# Patient Record
Sex: MALE | Race: WHITE | NOT HISPANIC OR LATINO | Employment: FULL TIME | URBAN - METROPOLITAN AREA
[De-identification: names, ages, dates, MRNs, and addresses within clinical notes are randomized per-mention and may not be internally consistent; named-entity substitution may affect disease eponyms.]

---

## 2024-04-27 ENCOUNTER — APPOINTMENT (OUTPATIENT)
Dept: CT IMAGING | Facility: CLINIC | Age: 54
End: 2024-04-27
Attending: FAMILY MEDICINE
Payer: COMMERCIAL

## 2024-04-27 ENCOUNTER — HOSPITAL ENCOUNTER (EMERGENCY)
Facility: CLINIC | Age: 54
Discharge: HOME | End: 2024-04-27
Attending: FAMILY MEDICINE | Admitting: FAMILY MEDICINE
Payer: COMMERCIAL

## 2024-04-27 VITALS
RESPIRATION RATE: 26 BRPM | TEMPERATURE: 97 F | WEIGHT: 215 LBS | SYSTOLIC BLOOD PRESSURE: 155 MMHG | DIASTOLIC BLOOD PRESSURE: 78 MMHG | BODY MASS INDEX: 31.84 KG/M2 | HEIGHT: 69 IN | HEART RATE: 83 BPM | OXYGEN SATURATION: 97 %

## 2024-04-27 DIAGNOSIS — N20.1 URETEROLITHIASIS: ICD-10-CM

## 2024-04-27 LAB
ACANTHOCYTES BLD QL SMEAR: NORMAL
ALBUMIN SERPL BCG-MCNC: 4.7 G/DL (ref 3.5–5.2)
ALBUMIN UR-MCNC: 30 MG/DL
ALP SERPL-CCNC: 70 U/L (ref 40–150)
ALT SERPL W P-5'-P-CCNC: 36 U/L (ref 0–70)
ANION GAP SERPL CALCULATED.3IONS-SCNC: 17 MMOL/L (ref 7–15)
APPEARANCE UR: ABNORMAL
AST SERPL W P-5'-P-CCNC: 40 U/L (ref 0–45)
AUER BODIES BLD QL SMEAR: NORMAL
BASO STIPL BLD QL SMEAR: NORMAL
BASOPHILS # BLD AUTO: 0.1 10E3/UL (ref 0–0.2)
BASOPHILS NFR BLD AUTO: 1 %
BILIRUB DIRECT SERPL-MCNC: <0.2 MG/DL (ref 0–0.3)
BILIRUB SERPL-MCNC: 0.5 MG/DL
BILIRUB UR QL STRIP: NEGATIVE
BITE CELLS BLD QL SMEAR: NORMAL
BLISTER CELLS BLD QL SMEAR: NORMAL
BUN SERPL-MCNC: 16 MG/DL (ref 6–20)
BURR CELLS BLD QL SMEAR: NORMAL
CALCIUM SERPL-MCNC: 9.7 MG/DL (ref 8.6–10)
CHLORIDE SERPL-SCNC: 102 MMOL/L (ref 98–107)
COLOR UR AUTO: YELLOW
CREAT SERPL-MCNC: 1.16 MG/DL (ref 0.67–1.17)
DACRYOCYTES BLD QL SMEAR: NORMAL
DEPRECATED HCO3 PLAS-SCNC: 23 MMOL/L (ref 22–29)
EGFRCR SERPLBLD CKD-EPI 2021: 75 ML/MIN/1.73M2
ELLIPTOCYTES BLD QL SMEAR: NORMAL
EOSINOPHIL # BLD AUTO: 0 10E3/UL (ref 0–0.7)
EOSINOPHIL NFR BLD AUTO: 0 %
ERYTHROCYTE [DISTWIDTH] IN BLOOD BY AUTOMATED COUNT: 12.8 % (ref 10–15)
FRAGMENTS BLD QL SMEAR: NORMAL
GIANT PLATELETS BLD QL SMEAR: NORMAL
GLUCOSE SERPL-MCNC: 148 MG/DL (ref 70–99)
GLUCOSE UR STRIP-MCNC: NEGATIVE MG/DL
HCT VFR BLD AUTO: 51.4 % (ref 40–53)
HGB BLD-MCNC: 17.3 G/DL (ref 13.3–17.7)
HGB C CRYSTALS: NORMAL
HGB UR QL STRIP: ABNORMAL
HOWELL-JOLLY BOD BLD QL SMEAR: NORMAL
IMM GRANULOCYTES # BLD: 0 10E3/UL
IMM GRANULOCYTES NFR BLD: 0 %
KETONES UR STRIP-MCNC: 5 MG/DL
LEUKOCYTE ESTERASE UR QL STRIP: NEGATIVE
LIPASE SERPL-CCNC: 30 U/L (ref 13–60)
LYMPHOCYTES # BLD AUTO: 1.6 10E3/UL (ref 0.8–5.3)
LYMPHOCYTES NFR BLD AUTO: 12 %
MCH RBC QN AUTO: 30.6 PG (ref 26.5–33)
MCHC RBC AUTO-ENTMCNC: 33.7 G/DL (ref 31.5–36.5)
MCV RBC AUTO: 91 FL (ref 78–100)
MONOCYTES # BLD AUTO: 0.6 10E3/UL (ref 0–1.3)
MONOCYTES NFR BLD AUTO: 4 %
MUCOUS THREADS #/AREA URNS LPF: PRESENT /LPF
NEUTROPHILS # BLD AUTO: 10.8 10E3/UL (ref 1.6–8.3)
NEUTROPHILS NFR BLD AUTO: 82 %
NEUTS HYPERSEG BLD QL SMEAR: NORMAL
NITRATE UR QL: NEGATIVE
NRBC # BLD AUTO: 0 10E3/UL
NRBC BLD AUTO-RTO: 0 /100
PATH REV: NORMAL
PH UR STRIP: 6 [PH] (ref 5–7)
PLAT MORPH BLD: NORMAL
PLATELET # BLD AUTO: 283 10E3/UL (ref 150–450)
POLYCHROMASIA BLD QL SMEAR: NORMAL
POTASSIUM SERPL-SCNC: 4.3 MMOL/L (ref 3.4–5.3)
PROT SERPL-MCNC: 7.7 G/DL (ref 6.4–8.3)
RBC # BLD AUTO: 5.66 10E6/UL (ref 4.4–5.9)
RBC AGGLUT BLD QL: NORMAL
RBC MORPH BLD: NORMAL
RBC URINE: >182 /HPF
ROULEAUX BLD QL SMEAR: NORMAL
SICKLE CELLS BLD QL SMEAR: NORMAL
SMUDGE CELLS BLD QL SMEAR: NORMAL
SODIUM SERPL-SCNC: 142 MMOL/L (ref 135–145)
SP GR UR STRIP: 1.03 (ref 1–1.03)
SPHEROCYTES BLD QL SMEAR: NORMAL
STOMATOCYTES BLD QL SMEAR: NORMAL
TARGETS BLD QL SMEAR: NORMAL
TOXIC GRANULES BLD QL SMEAR: NORMAL
UROBILINOGEN UR STRIP-MCNC: 2 MG/DL
VARIANT LYMPHS BLD QL SMEAR: NORMAL
WBC # BLD AUTO: 13.1 10E3/UL (ref 4–11)
WBC URINE: 1 /HPF

## 2024-04-27 PROCEDURE — 36415 COLL VENOUS BLD VENIPUNCTURE: CPT | Performed by: FAMILY MEDICINE

## 2024-04-27 PROCEDURE — 258N000003 HC RX IP 258 OP 636: Performed by: FAMILY MEDICINE

## 2024-04-27 PROCEDURE — 99284 EMERGENCY DEPT VISIT MOD MDM: CPT | Performed by: FAMILY MEDICINE

## 2024-04-27 PROCEDURE — 96375 TX/PRO/DX INJ NEW DRUG ADDON: CPT | Performed by: FAMILY MEDICINE

## 2024-04-27 PROCEDURE — 99285 EMERGENCY DEPT VISIT HI MDM: CPT | Mod: 25 | Performed by: FAMILY MEDICINE

## 2024-04-27 PROCEDURE — 81001 URINALYSIS AUTO W/SCOPE: CPT | Performed by: FAMILY MEDICINE

## 2024-04-27 PROCEDURE — 83690 ASSAY OF LIPASE: CPT | Performed by: FAMILY MEDICINE

## 2024-04-27 PROCEDURE — 96374 THER/PROPH/DIAG INJ IV PUSH: CPT | Performed by: FAMILY MEDICINE

## 2024-04-27 PROCEDURE — 96361 HYDRATE IV INFUSION ADD-ON: CPT | Performed by: FAMILY MEDICINE

## 2024-04-27 PROCEDURE — 82248 BILIRUBIN DIRECT: CPT | Performed by: FAMILY MEDICINE

## 2024-04-27 PROCEDURE — 74176 CT ABD & PELVIS W/O CONTRAST: CPT

## 2024-04-27 PROCEDURE — 80048 BASIC METABOLIC PNL TOTAL CA: CPT | Performed by: FAMILY MEDICINE

## 2024-04-27 PROCEDURE — 85025 COMPLETE CBC W/AUTO DIFF WBC: CPT | Performed by: FAMILY MEDICINE

## 2024-04-27 PROCEDURE — 96376 TX/PRO/DX INJ SAME DRUG ADON: CPT | Performed by: FAMILY MEDICINE

## 2024-04-27 PROCEDURE — 250N000011 HC RX IP 250 OP 636: Performed by: FAMILY MEDICINE

## 2024-04-27 RX ORDER — ONDANSETRON 4 MG/1
4 TABLET, ORALLY DISINTEGRATING ORAL ONCE
Status: COMPLETED | OUTPATIENT
Start: 2024-04-27 | End: 2024-04-27

## 2024-04-27 RX ORDER — OXYCODONE HYDROCHLORIDE 5 MG/1
5 TABLET ORAL EVERY 6 HOURS PRN
Qty: 12 TABLET | Refills: 0 | Status: SHIPPED | OUTPATIENT
Start: 2024-04-27 | End: 2024-04-30

## 2024-04-27 RX ORDER — HYDROMORPHONE HYDROCHLORIDE 1 MG/ML
0.5 INJECTION, SOLUTION INTRAMUSCULAR; INTRAVENOUS; SUBCUTANEOUS
Status: DISCONTINUED | OUTPATIENT
Start: 2024-04-27 | End: 2024-04-27 | Stop reason: HOSPADM

## 2024-04-27 RX ORDER — TAMSULOSIN HYDROCHLORIDE 0.4 MG/1
0.4 CAPSULE ORAL DAILY
Qty: 10 CAPSULE | Refills: 0 | Status: SHIPPED | OUTPATIENT
Start: 2024-04-27 | End: 2024-05-07

## 2024-04-27 RX ORDER — ONDANSETRON 4 MG/1
4 TABLET, ORALLY DISINTEGRATING ORAL EVERY 8 HOURS PRN
Qty: 10 TABLET | Refills: 0 | Status: SHIPPED | OUTPATIENT
Start: 2024-04-27 | End: 2024-04-30

## 2024-04-27 RX ORDER — KETOROLAC TROMETHAMINE 15 MG/ML
15 INJECTION, SOLUTION INTRAMUSCULAR; INTRAVENOUS ONCE
Status: COMPLETED | OUTPATIENT
Start: 2024-04-27 | End: 2024-04-27

## 2024-04-27 RX ORDER — ONDANSETRON 2 MG/ML
4 INJECTION INTRAMUSCULAR; INTRAVENOUS ONCE
Status: COMPLETED | OUTPATIENT
Start: 2024-04-27 | End: 2024-04-27

## 2024-04-27 RX ADMIN — ONDANSETRON 4 MG: 4 TABLET, ORALLY DISINTEGRATING ORAL at 10:52

## 2024-04-27 RX ADMIN — ONDANSETRON 4 MG: 2 INJECTION INTRAMUSCULAR; INTRAVENOUS at 11:34

## 2024-04-27 RX ADMIN — SODIUM CHLORIDE 1000 ML: 9 INJECTION, SOLUTION INTRAVENOUS at 11:28

## 2024-04-27 RX ADMIN — HYDROMORPHONE HYDROCHLORIDE 0.5 MG: 1 INJECTION, SOLUTION INTRAMUSCULAR; INTRAVENOUS; SUBCUTANEOUS at 13:57

## 2024-04-27 RX ADMIN — KETOROLAC TROMETHAMINE 15 MG: 15 INJECTION, SOLUTION INTRAMUSCULAR; INTRAVENOUS at 11:29

## 2024-04-27 RX ADMIN — HYDROMORPHONE HYDROCHLORIDE 0.5 MG: 1 INJECTION, SOLUTION INTRAMUSCULAR; INTRAVENOUS; SUBCUTANEOUS at 11:48

## 2024-04-27 ASSESSMENT — ACTIVITIES OF DAILY LIVING (ADL)
ADLS_ACUITY_SCORE: 35

## 2024-04-27 ASSESSMENT — COLUMBIA-SUICIDE SEVERITY RATING SCALE - C-SSRS
2. HAVE YOU ACTUALLY HAD ANY THOUGHTS OF KILLING YOURSELF IN THE PAST MONTH?: NO
6. HAVE YOU EVER DONE ANYTHING, STARTED TO DO ANYTHING, OR PREPARED TO DO ANYTHING TO END YOUR LIFE?: NO
1. IN THE PAST MONTH, HAVE YOU WISHED YOU WERE DEAD OR WISHED YOU COULD GO TO SLEEP AND NOT WAKE UP?: NO

## 2024-04-27 NOTE — ED PROVIDER NOTES
"  History     Chief Complaint   Patient presents with    Flank Pain     HPI  Iain Claros is a 53 year old male, unknown past medical history, presents to the emergency department with concerns of severe left-sided flank pain which began intermittently on Thursday and since about 4:00 this morning became much more severe.  Dark urine and some dysuria.  History is obtained from the patient who presents with his wife.  He states he came in today because he was awoken from sleep at around 4:00 this morning with severe left-sided flank pain with nausea and multiple episodes of vomiting.  Tried to take some fluids and some OTC pain medication but was completely unable to keep anything down.  No fever chills or sweats recently.  No trauma or injury of any kind.  Patient has not had symptoms like this before although does note that over the past few days he did have some twinges of pain in the left flank area in the absence of any kind of trauma lifting straining bending etc.  He thought maybe this was because of uncomfortable seating and positioning on an aircraft as he was on vacation recently when the symptoms began earlier in the week.      Allergies:  Allergies   Allergen Reactions    Penicillins Rash       Problem List:    There are no problems to display for this patient.       Past Medical History:    No past medical history on file.    Past Surgical History:    No past surgical history on file.    Family History:    No family history on file.    Social History:  Marital Status:  Single [1]        Medications:    ondansetron (ZOFRAN ODT) 4 MG ODT tab  oxyCODONE (ROXICODONE) 5 MG tablet  tamsulosin (FLOMAX) 0.4 MG capsule          Review of Systems   All other systems reviewed and are negative.      Physical Exam   BP: (!) 163/100  Pulse: 81  Temp: 97  F (36.1  C)  Resp: 26  Height: 175.3 cm (5' 9\")  Weight: 97.5 kg (215 lb)  SpO2: 99 %      Physical Exam  Vitals and nursing note reviewed.   Constitutional:  "      General: He is in acute distress.      Appearance: Normal appearance. He is normal weight. He is ill-appearing.   HENT:      Head: Normocephalic and atraumatic.      Right Ear: Tympanic membrane, ear canal and external ear normal.      Left Ear: Tympanic membrane, ear canal and external ear normal.      Nose: Nose normal.      Mouth/Throat:      Mouth: Mucous membranes are dry.      Pharynx: Oropharynx is clear.   Eyes:      Extraocular Movements: Extraocular movements intact.      Conjunctiva/sclera: Conjunctivae normal.      Pupils: Pupils are equal, round, and reactive to light.   Cardiovascular:      Rate and Rhythm: Normal rate and regular rhythm.      Pulses: Normal pulses.      Heart sounds: Normal heart sounds.   Pulmonary:      Effort: Pulmonary effort is normal.      Breath sounds: Normal breath sounds.   Abdominal:      General: Bowel sounds are normal.      Palpations: Abdomen is soft.   Musculoskeletal:         General: Normal range of motion.      Cervical back: Normal range of motion and neck supple.   Skin:     General: Skin is warm and dry.      Capillary Refill: Capillary refill takes less than 2 seconds.   Neurological:      General: No focal deficit present.      Mental Status: He is alert and oriented to person, place, and time.   Psychiatric:         Mood and Affect: Mood normal.         Behavior: Behavior normal.         ED Course        Procedures         Results for orders placed or performed during the hospital encounter of 04/27/24 (from the past 24 hour(s))   UA with Microscopic reflex to Culture    Specimen: Urine, Midstream   Result Value Ref Range    Color Urine Yellow Colorless, Straw, Light Yellow, Yellow    Appearance Urine Slightly Cloudy (A) Clear    Glucose Urine Negative Negative mg/dL    Bilirubin Urine Negative Negative    Ketones Urine 5 (A) Negative mg/dL    Specific Gravity Urine 1.029 1.003 - 1.035    Blood Urine Moderate (A) Negative    pH Urine 6.0 5.0 - 7.0     Protein Albumin Urine 30 (A) Negative mg/dL    Urobilinogen Urine 2.0 Normal, 2.0 mg/dL    Nitrite Urine Negative Negative    Leukocyte Esterase Urine Negative Negative    Mucus Urine Present (A) None Seen /LPF    RBC Urine >182 (H) <=2 /HPF    WBC Urine 1 <=5 /HPF    Narrative    Urine Culture not indicated   CBC with Platelets & Differential    Narrative    The following orders were created for panel order CBC with Platelets & Differential.  Procedure                               Abnormality         Status                     ---------                               -----------         ------                     CBC with platelets and d...[[id number]]  Abnormal            Final result               RBC and Platelet Morphology[[id number]]                      Final result                 Please view results for these tests on the individual orders.   Basic metabolic panel   Result Value Ref Range    Sodium 142 135 - 145 mmol/L    Potassium 4.3 3.4 - 5.3 mmol/L    Chloride 102 98 - 107 mmol/L    Carbon Dioxide (CO2) 23 22 - 29 mmol/L    Anion Gap 17 (H) 7 - 15 mmol/L    Urea Nitrogen 16.0 6.0 - 20.0 mg/dL    Creatinine 1.16 0.67 - 1.17 mg/dL    GFR Estimate 75 >60 mL/min/1.73m2    Calcium 9.7 8.6 - 10.0 mg/dL    Glucose 148 (H) 70 - 99 mg/dL   CBC with platelets and differential   Result Value Ref Range    WBC Count 13.1 (H) 4.0 - 11.0 10e3/uL    RBC Count 5.66 4.40 - 5.90 10e6/uL    Hemoglobin 17.3 13.3 - 17.7 g/dL    Hematocrit 51.4 40.0 - 53.0 %    MCV 91 78 - 100 fL    MCH 30.6 26.5 - 33.0 pg    MCHC 33.7 31.5 - 36.5 g/dL    RDW 12.8 10.0 - 15.0 %    Platelet Count 283 150 - 450 10e3/uL    % Neutrophils 82 %    % Lymphocytes 12 %    % Monocytes 4 %    % Eosinophils 0 %    % Basophils 1 %    % Immature Granulocytes 0 %    NRBCs per 100 WBC 0 <1 /100    Absolute Neutrophils 10.8 (H) 1.6 - 8.3 10e3/uL    Absolute Lymphocytes 1.6 0.8 - 5.3 10e3/uL    Absolute Monocytes 0.6 0.0 - 1.3 10e3/uL    Absolute Eosinophils 0.0  0.0 - 0.7 10e3/uL    Absolute Basophils 0.1 0.0 - 0.2 10e3/uL    Absolute Immature Granulocytes 0.0 <=0.4 10e3/uL    Absolute NRBCs 0.0 10e3/uL   Hepatic panel   Result Value Ref Range    Protein Total 7.7 6.4 - 8.3 g/dL    Albumin 4.7 3.5 - 5.2 g/dL    Bilirubin Total 0.5 <=1.2 mg/dL    Alkaline Phosphatase 70 40 - 150 U/L    AST 40 0 - 45 U/L    ALT 36 0 - 70 U/L    Bilirubin Direct <0.20 0.00 - 0.30 mg/dL   Lipase   Result Value Ref Range    Lipase 30 13 - 60 U/L   RBC and Platelet Morphology   Result Value Ref Range    RBC Morphology Confirmed RBC Indices     Platelet Assessment  Automated Count Confirmed. Platelet morphology is normal.     Automated Count Confirmed. Platelet morphology is normal.    Giant Platelets      Acanthocytes      Estela Rods      Basophilic Stippling      Bite Cells      Blister Cells      Radha Cells      Elliptocytes      Hgb C Crystals      Galindo-Jolly Bodies      Hypersegmented Neutrophils      Polychromasia      RBC agglutination      RBC Fragments      Reactive Lymphocytes      Rouleaux      Sickle Cells      Smudge Cells      Spherocytes      Stomatocytes      Target Cells      Teardrop Cells      Toxic Neutrophils      Pathologist Review Comments (Blood)     Abd/pelvis CT - no contrast - Stone Protocol    Narrative    EXAM: CT ABDOMEN PELVIS W/O CONTRAST  LOCATION: [facility]  DATE: 4/27/2024    INDICATION: Left flank pain, suspect ureterolithiasis  COMPARISON: None.  TECHNIQUE: CT scan of the abdomen and pelvis was performed without IV contrast. Multiplanar reformats were obtained. Dose reduction techniques were used.  CONTRAST: None.    FINDINGS:   LOWER CHEST: Calcified granuloma in the left lower lobe. Calcified left hilar lymph nodes. Small to moderate-sized hiatal hernia.    HEPATOBILIARY: Hepatic steatosis. No calcified gallstones or biliary ductal dilatation.    PANCREAS: Normal.    SPLEEN: Punctate calcified splenic granuloma.    ADRENAL  GLANDS: Normal.    KIDNEYS/BLADDER: A 3 x 4 mm obstructing calculus in the mid left ureter with moderate left hydronephrosis and perinephric stranding. No right-sided calculi or hydronephrosis. No urinary bladder calculi.    BOWEL: No small bowel or colonic obstruction or inflammatory changes. Normal appendix.    LYMPH NODES: No lymphadenopathy.    VASCULATURE: No abdominal aortic aneurysm.    PELVIC ORGANS: No pelvic masses. No free fluid or fluid collections.    MUSCULOSKELETAL: Small fat-containing umbilical hernia measuring 2.5 x 2.1 cm. Moderate-sized fat-containing right inguinal hernia. No suspicious lesions in the bones.      Impression    IMPRESSION:   1.  A 4 mm obstructing calculus in the left mid ureter with moderate left hydronephrosis and perinephric stranding.  2.  Mild hepatic steatosis.  3.  Moderate-sized fat-containing left inguinal hernia. Small fat-containing umbilical hernia.     2:58 PM  I reviewed all lab diagnostics and independently interpreted a CT scan prior to the radiologist read which was unfortunately delayed for unclear reasons.  I agree with the radiologist interpretation.  Discussed expectant stone management and criteria for return to the emergency department with the patient.  He is comfortable currently and would very much like to try expectant stone management rather than intervention by urology at this point.  He is given contact information for urology disposition is to home on oral NSAIDs ibuprofen 4 mg p.o. 3 times daily with food, Flomax to hopefully facilitate passage of this more proximal stone, oxycodone for breakthrough pain, Zofran for nausea/vomiting.  Vigorous hydration.      Medications   HYDROmorphone (PF) (DILAUDID) injection 0.5 mg (0.5 mg Intravenous $Given 4/27/24 1357)   ondansetron (ZOFRAN ODT) ODT tab 4 mg (4 mg Oral $Given 4/27/24 1052)   sodium chloride 0.9% BOLUS 1,000 mL (0 mLs Intravenous Stopped 4/27/24 1427)   ketorolac (TORADOL) injection 15 mg (15  mg Intravenous $Given 4/27/24 1129)   ondansetron (ZOFRAN) injection 4 mg (4 mg Intravenous $Given 4/27/24 1134)       Assessments & Plan (with Medical Decision Making)   Assessment and plan with medical decision making at the time stamp above.      Disclaimer: This note consists of symbols derived from keyboarding, dictation and/or voice recognition software. As a result, there may be errors in the script that have gone undetected. Please consider this when interpreting information found in this chart.    I have reviewed the nursing notes.    I have reviewed the findings, diagnosis, plan and need for follow up with the patient.          New Prescriptions    ONDANSETRON (ZOFRAN ODT) 4 MG ODT TAB    Take 1 tablet (4 mg) by mouth every 8 hours as needed for nausea    OXYCODONE (ROXICODONE) 5 MG TABLET    Take 1 tablet (5 mg) by mouth every 6 hours as needed for pain    TAMSULOSIN (FLOMAX) 0.4 MG CAPSULE    Take 1 capsule (0.4 mg) by mouth daily for 10 days       Final diagnoses:   Ureterolithiasis - 4 mm left ureteral stone       4/27/2024   [facility] EMERGENCY DEPT       Kolby Pearl MD  04/27/24 3227

## 2024-04-27 NOTE — ED TRIAGE NOTES
Pt presents in severe distress with left sided flank pain. Mild symptoms began intermittently on Thursday. Today at 0400 severe pain began. Dark urine and some pain with urination. Pt also presents with nausea and vomiting. Zofran given in triage.      Triage Assessment (Adult)       Row Name 04/27/24 1048          Triage Assessment    Airway WDL WDL        Respiratory WDL    Respiratory WDL X;rhythm/pattern     Rhythm/Pattern, Respiratory tachypneic        Skin Circulation/Temperature WDL    Skin Circulation/Temperature WDL WDL        Cardiac WDL    Cardiac WDL WDL        Peripheral/Neurovascular WDL    Peripheral Neurovascular WDL WDL        Cognitive/Neuro/Behavioral WDL    Cognitive/Neuro/Behavioral WDL X;mood/behavior     Mood/Behavior restless

## 2024-04-27 NOTE — DISCHARGE INSTRUCTIONS
Push fluids.  Ibuprofen 400 mg p.o. 3 times daily with food.  Oxycodone for breakthrough pain.  Zofran as needed for nausea/vomiting.  Flomax once daily as we discussed to hopefully expedite stone passage.  As long as you are able to tolerate outpatient management you may wait up to a couple of weeks to pass the stone.  If you are not able to tolerate outpatient management, expectant, for the stone you are welcome to return to the emergency department at any time or contact urology at the number given.

## 2024-04-27 NOTE — ED NOTES
Left flank pain, nausea, vomiting that started at 0400 this morning.  History of kidney stones.  Patient stated that his urine is concentrated.  Denies constipation, diarrhea, pain with urination.

## 2024-04-28 ENCOUNTER — HOSPITAL ENCOUNTER (EMERGENCY)
Facility: CLINIC | Age: 54
Discharge: HOME | End: 2024-04-29
Attending: FAMILY MEDICINE | Admitting: FAMILY MEDICINE
Payer: COMMERCIAL

## 2024-04-28 DIAGNOSIS — N20.1 URETERAL STONE: ICD-10-CM

## 2024-04-28 PROCEDURE — 96375 TX/PRO/DX INJ NEW DRUG ADDON: CPT

## 2024-04-28 PROCEDURE — 96361 HYDRATE IV INFUSION ADD-ON: CPT

## 2024-04-28 PROCEDURE — 96376 TX/PRO/DX INJ SAME DRUG ADON: CPT

## 2024-04-28 PROCEDURE — 250N000011 HC RX IP 250 OP 636: Performed by: FAMILY MEDICINE

## 2024-04-28 PROCEDURE — 250N000013 HC RX MED GY IP 250 OP 250 PS 637: Performed by: FAMILY MEDICINE

## 2024-04-28 PROCEDURE — 99284 EMERGENCY DEPT VISIT MOD MDM: CPT | Mod: 25

## 2024-04-28 PROCEDURE — 99285 EMERGENCY DEPT VISIT HI MDM: CPT | Performed by: FAMILY MEDICINE

## 2024-04-28 PROCEDURE — 258N000003 HC RX IP 258 OP 636: Performed by: FAMILY MEDICINE

## 2024-04-28 PROCEDURE — 96374 THER/PROPH/DIAG INJ IV PUSH: CPT

## 2024-04-28 RX ORDER — DOCUSATE SODIUM 100 MG/1
100 CAPSULE, LIQUID FILLED ORAL 2 TIMES DAILY
Status: DISCONTINUED | OUTPATIENT
Start: 2024-04-28 | End: 2024-04-29 | Stop reason: HOSPADM

## 2024-04-28 RX ORDER — HYDROMORPHONE HYDROCHLORIDE 1 MG/ML
0.5 INJECTION, SOLUTION INTRAMUSCULAR; INTRAVENOUS; SUBCUTANEOUS
Status: DISCONTINUED | OUTPATIENT
Start: 2024-04-28 | End: 2024-04-29 | Stop reason: HOSPADM

## 2024-04-28 RX ORDER — OXYCODONE HYDROCHLORIDE 5 MG/1
5-10 TABLET ORAL EVERY 6 HOURS PRN
Qty: 12 TABLET | Refills: 0 | Status: SHIPPED | OUTPATIENT
Start: 2024-04-28 | End: 2024-05-01

## 2024-04-28 RX ORDER — KETOROLAC TROMETHAMINE 15 MG/ML
10 INJECTION, SOLUTION INTRAMUSCULAR; INTRAVENOUS ONCE
Status: COMPLETED | OUTPATIENT
Start: 2024-04-28 | End: 2024-04-28

## 2024-04-28 RX ORDER — ONDANSETRON 2 MG/ML
4 INJECTION INTRAMUSCULAR; INTRAVENOUS
Status: DISCONTINUED | OUTPATIENT
Start: 2024-04-28 | End: 2024-04-29 | Stop reason: HOSPADM

## 2024-04-28 RX ADMIN — DOCUSATE SODIUM 100 MG: 100 CAPSULE, LIQUID FILLED ORAL at 22:41

## 2024-04-28 RX ADMIN — ONDANSETRON 4 MG: 2 INJECTION INTRAMUSCULAR; INTRAVENOUS at 21:15

## 2024-04-28 RX ADMIN — HYDROMORPHONE HYDROCHLORIDE 0.5 MG: 1 INJECTION, SOLUTION INTRAMUSCULAR; INTRAVENOUS; SUBCUTANEOUS at 21:14

## 2024-04-28 RX ADMIN — KETOROLAC TROMETHAMINE 10 MG: 15 INJECTION, SOLUTION INTRAMUSCULAR; INTRAVENOUS at 21:15

## 2024-04-28 RX ADMIN — SODIUM CHLORIDE 1000 ML: 9 INJECTION, SOLUTION INTRAVENOUS at 21:16

## 2024-04-28 RX ADMIN — HYDROMORPHONE HYDROCHLORIDE 0.5 MG: 1 INJECTION, SOLUTION INTRAMUSCULAR; INTRAVENOUS; SUBCUTANEOUS at 22:30

## 2024-04-28 ASSESSMENT — ACTIVITIES OF DAILY LIVING (ADL)
ADLS_ACUITY_SCORE: 35

## 2024-04-28 ASSESSMENT — COLUMBIA-SUICIDE SEVERITY RATING SCALE - C-SSRS
2. HAVE YOU ACTUALLY HAD ANY THOUGHTS OF KILLING YOURSELF IN THE PAST MONTH?: NO
1. IN THE PAST MONTH, HAVE YOU WISHED YOU WERE DEAD OR WISHED YOU COULD GO TO SLEEP AND NOT WAKE UP?: NO
6. HAVE YOU EVER DONE ANYTHING, STARTED TO DO ANYTHING, OR PREPARED TO DO ANYTHING TO END YOUR LIFE?: NO

## 2024-04-29 VITALS
HEART RATE: 86 BPM | OXYGEN SATURATION: 95 % | RESPIRATION RATE: 18 BRPM | BODY MASS INDEX: 31.84 KG/M2 | TEMPERATURE: 98.1 F | WEIGHT: 215 LBS | SYSTOLIC BLOOD PRESSURE: 137 MMHG | DIASTOLIC BLOOD PRESSURE: 93 MMHG | HEIGHT: 69 IN

## 2024-04-29 NOTE — DISCHARGE INSTRUCTIONS
RETURN TO THE EMERGENCY ROOM FOR THE FOLLOWING:    Severely worsened pain, fever greater than 101, vomiting and dehydration, or at anytime for any concern.    FOLLOW UP:    Urology referral order placed at the time of discharge, expect a phone call within the next few days to help with scheduling.    TREATMENT RECOMMENDATIONS:    Ibuprofen 600 mg every six hours for pain (7 days duration).  Tylenol 1000 mg every six hours for pain (7 days duration).  Therefore, you can alternate these every three hours and do it safely.  ONLY take these medications if it is safe to do so given your medical history.    Oxycodone 1-2 tabs every six hours as needed for pain control that is not relieved by the above.    Consider MiraLAX to help promote a regular stool pattern.  The goal is to have a soft, easy stool either every day or every other day.  You can titrate MiraLAX to achieve this goal.  MiraLAX is a nonsoluble fiber and is not addictive.  It will not cause bowel dysfunction or pathology.      NURSE ADVICE LINE:  [phone] or [phone]

## 2024-04-29 NOTE — ED TRIAGE NOTES
Pt arrives with c/o sever L flank/groin pain. Pt was dx with a kidney stone yesterday and sent home with oxycodone. Pt reports taking approx. 6 oxycodone since yesterday. Pt reports no BM today, he usually goes daily.

## 2024-04-29 NOTE — ED TRIAGE NOTES
Pt presents with increased pain related to a very recently diagnosed kidney stone. Was prescribed oxycodone for pain management at home but has not had adequate relief. Endorses nausea with emesis and constipation. Wife reports pt did an enema earlier today.     Triage Assessment (Adult)       Row Name 04/28/24 7415          Triage Assessment    Airway WDL WDL        Respiratory WDL    Respiratory WDL WDL        Skin Circulation/Temperature WDL    Skin Circulation/Temperature WDL WDL        Cardiac WDL    Cardiac WDL WDL        Peripheral/Neurovascular WDL    Peripheral Neurovascular WDL WDL        Cognitive/Neuro/Behavioral WDL    Cognitive/Neuro/Behavioral WDL WDL        [place] Coma Scale    Best Eye Response 4-->(E4) spontaneous     Best Motor Response 6-->(M6) obeys commands     Best Verbal Response 5-->(V5) oriented     [place] Coma Scale Score 15

## 2024-04-29 NOTE — ED PROVIDER NOTES
"  HPI   Patient is a 53-year-old male presenting with his female partner by private car for recurring left flank pain.  Per my chart review, the patient was seen in the ED yesterday and diagnosed with a 4 mm mid ureteral stone.  He had not had a ureteral stone previously.  No prior abdominal surgery.  He was given a prescription of Zofran, oxycodone, and tamsulosin.  He has been using the Flomax.  He has been taking the oxycodone 1 tablet every 4 hours.  He has not been using ibuprofen or Tylenol.  He started to use the Zofran yesterday but it seemed to elicit more nausea and so he stopped.    The patient has been nauseous with vomiting and worsened pain throughout the overnight.  This morning at about 4:00 AM he was retching and he says, \"I think that move the stone because it lessen the pain afterward.\"  Then, this evening his pain severely worsened again.  He is nauseous.  No fever.  No other new symptoms described.      Allergies:  Allergies   Allergen Reactions    Penicillins Rash     Problem List:    There are no problems to display for this patient.     Past Medical History:    No past medical history on file.  Past Surgical History:    No past surgical history on file.  Family History:    No family history on file.  Social History:  Marital Status:  Single [1]      Medications:    oxyCODONE (ROXICODONE) 5 MG tablet  ondansetron (ZOFRAN ODT) 4 MG ODT tab  oxyCODONE (ROXICODONE) 5 MG tablet  tamsulosin (FLOMAX) 0.4 MG capsule      Review of Systems   All other systems reviewed and are negative.      PE   BP: (!) 176/101  Pulse: 86  Temp: 98.1  F (36.7  C)  Resp: 18  Height: 175.3 cm (5' 9\")  Weight: 97.5 kg (215 lb)  SpO2: 95 %  Physical Exam  Vitals and nursing note reviewed.   Constitutional:       General: He is in acute distress.   HENT:      Head: Atraumatic.      Right Ear: External ear normal.      Left Ear: External ear normal.      Nose: Nose normal.      Mouth/Throat:      Mouth: Mucous membranes are " moist.      Pharynx: Oropharynx is clear.   Eyes:      General: No scleral icterus.     Extraocular Movements: Extraocular movements intact.      Conjunctiva/sclera: Conjunctivae normal.      Pupils: Pupils are equal, round, and reactive to light.   Cardiovascular:      Rate and Rhythm: Normal rate.   Pulmonary:      Effort: Pulmonary effort is normal. No respiratory distress.   Musculoskeletal:         General: Normal range of motion.      Cervical back: Normal range of motion.   Skin:     General: Skin is warm and dry.   Neurological:      Mental Status: He is alert and oriented to person, place, and time.   Psychiatric:         Behavior: Behavior normal.         ED COURSE and MDM   2117.  The patient has obvious discomfort.  Dilaudid, Toradol, fluid bolus, Zofran ordered.    2356.  Patient with improvement after medications given.  Oxycodone provided, 12 tablets.  I changed the dose to 1 to 2 tablets every 6 hours instead of 1 tablet every 4 hours.  Schedule ibuprofen and Tylenol as directed.  MiraLAX as directed.  Urology referral order placed.  Patient agrees with plan.    Electronic medical chart reviewed, including medical problems, medications, medical allergies, social history.  Recent hospitalizations and surgical procedures reviewed.  Recent clinic visits and consultations reviewed.  Recent labs and test results reviewed.  Nursing notes reviewed.    The patient, their parent if applicable, and/or their medical decision maker(s) and I have reviewed all of the available historical information, applicable PMH, physical exam findings, and objective diagnostic data gathered during this ED visit.  We then discussed all work-up options and then together agreed upon the course taken during this visit.  The ultimate disposition and plan was a cooperative decision made between myself and the patient, their parent if applicable, and/or their legal decision maker(s).  The risks and benefits of all decisions made  during this visit were discussed to the best of my abilities given the circumstances, and all parties are understanding of the pertinent ramifications of these decisions.      LABS  Labs Ordered and Resulted from Time of ED Arrival to Time of ED Departure - No data to display    IMAGING  Images reviewed by me.  Radiology report also reviewed.  No orders to display       Procedures    Medications   HYDROmorphone (PF) (DILAUDID) injection 0.5 mg (0.5 mg Intravenous $Given 4/28/24 2230)   ondansetron (ZOFRAN) injection 4 mg (4 mg Intravenous $Given 4/28/24 2115)   docusate sodium (COLACE) capsule 100 mg (100 mg Oral $Given 4/28/24 2241)   ketorolac (TORADOL) injection 10 mg (10 mg Intravenous $Given 4/28/24 2115)   sodium chloride 0.9% BOLUS 1,000 mL (0 mLs Intravenous Stopped 4/28/24 2231)         IMPRESSION       ICD-10-CM    1. Ureteral stone  N20.1 Adult Urology  Referral               Medication List        Modified      * oxyCODONE 5 MG tablet  Commonly known as: ROXICODONE  5 mg, Oral, EVERY 6 HOURS PRN  What changed: Another medication with the same name was added. Make sure you understand how and when to take each.     * oxyCODONE 5 MG tablet  Commonly known as: ROXICODONE  5-10 mg, Oral, EVERY 6 HOURS PRN  What changed: You were already taking a medication with the same name, and this prescription was added. Make sure you understand how and when to take each.           * This list has 2 medication(s) that are the same as other medications prescribed for you. Read the directions carefully, and ask your doctor or other care provider to review them with you.                                      Abhishek Braga MD  04/28/24 7831

## 2024-04-29 NOTE — ED NOTES
Pt resting comfortably, states pain is 3/10, VSS, call light within reach, will continue to monitor and assist as needed.

## 2024-04-30 ENCOUNTER — PATIENT OUTREACH (OUTPATIENT)
Dept: UROLOGY | Facility: CLINIC | Age: 54
End: 2024-04-30
Payer: COMMERCIAL

## 2024-04-30 ENCOUNTER — HOSPITAL ENCOUNTER (EMERGENCY)
Facility: CLINIC | Age: 54
Discharge: HOME | End: 2024-04-30
Attending: EMERGENCY MEDICINE | Admitting: EMERGENCY MEDICINE
Payer: COMMERCIAL

## 2024-04-30 VITALS
RESPIRATION RATE: 24 BRPM | WEIGHT: 215 LBS | HEIGHT: 69 IN | BODY MASS INDEX: 31.84 KG/M2 | DIASTOLIC BLOOD PRESSURE: 82 MMHG | TEMPERATURE: 97.1 F | HEART RATE: 74 BPM | OXYGEN SATURATION: 96 % | SYSTOLIC BLOOD PRESSURE: 145 MMHG

## 2024-04-30 DIAGNOSIS — R10.9 LEFT FLANK PAIN: ICD-10-CM

## 2024-04-30 DIAGNOSIS — N20.1 URETERAL STONE: ICD-10-CM

## 2024-04-30 LAB
HOLD SPECIMEN: NORMAL
HOLD SPECIMEN: NORMAL

## 2024-04-30 PROCEDURE — 96361 HYDRATE IV INFUSION ADD-ON: CPT | Performed by: EMERGENCY MEDICINE

## 2024-04-30 PROCEDURE — 258N000003 HC RX IP 258 OP 636: Performed by: EMERGENCY MEDICINE

## 2024-04-30 PROCEDURE — 96374 THER/PROPH/DIAG INJ IV PUSH: CPT | Performed by: EMERGENCY MEDICINE

## 2024-04-30 PROCEDURE — 250N000011 HC RX IP 250 OP 636: Performed by: EMERGENCY MEDICINE

## 2024-04-30 PROCEDURE — 96375 TX/PRO/DX INJ NEW DRUG ADDON: CPT | Performed by: EMERGENCY MEDICINE

## 2024-04-30 PROCEDURE — 99284 EMERGENCY DEPT VISIT MOD MDM: CPT | Performed by: EMERGENCY MEDICINE

## 2024-04-30 PROCEDURE — 96376 TX/PRO/DX INJ SAME DRUG ADON: CPT | Performed by: EMERGENCY MEDICINE

## 2024-04-30 PROCEDURE — 99284 EMERGENCY DEPT VISIT MOD MDM: CPT | Mod: 25 | Performed by: EMERGENCY MEDICINE

## 2024-04-30 RX ORDER — HYDROMORPHONE HYDROCHLORIDE 1 MG/ML
0.5 INJECTION, SOLUTION INTRAMUSCULAR; INTRAVENOUS; SUBCUTANEOUS
Status: DISCONTINUED | OUTPATIENT
Start: 2024-04-30 | End: 2024-04-30 | Stop reason: HOSPADM

## 2024-04-30 RX ORDER — ONDANSETRON 4 MG/1
4 TABLET, ORALLY DISINTEGRATING ORAL EVERY 8 HOURS PRN
Qty: 6 TABLET | Refills: 0 | Status: SHIPPED | OUTPATIENT
Start: 2024-04-30

## 2024-04-30 RX ORDER — ONDANSETRON 2 MG/ML
4 INJECTION INTRAMUSCULAR; INTRAVENOUS ONCE
Status: COMPLETED | OUTPATIENT
Start: 2024-04-30 | End: 2024-04-30

## 2024-04-30 RX ORDER — KETOROLAC TROMETHAMINE 15 MG/ML
15 INJECTION, SOLUTION INTRAMUSCULAR; INTRAVENOUS ONCE
Status: COMPLETED | OUTPATIENT
Start: 2024-04-30 | End: 2024-04-30

## 2024-04-30 RX ADMIN — SODIUM CHLORIDE, POTASSIUM CHLORIDE, SODIUM LACTATE AND CALCIUM CHLORIDE 1000 ML: 600; 310; 30; 20 INJECTION, SOLUTION INTRAVENOUS at 12:06

## 2024-04-30 RX ADMIN — SODIUM CHLORIDE, POTASSIUM CHLORIDE, SODIUM LACTATE AND CALCIUM CHLORIDE 1000 ML: 600; 310; 30; 20 INJECTION, SOLUTION INTRAVENOUS at 10:48

## 2024-04-30 RX ADMIN — ONDANSETRON 4 MG: 2 INJECTION INTRAMUSCULAR; INTRAVENOUS at 10:49

## 2024-04-30 RX ADMIN — HYDROMORPHONE HYDROCHLORIDE 0.5 MG: 1 INJECTION, SOLUTION INTRAMUSCULAR; INTRAVENOUS; SUBCUTANEOUS at 10:49

## 2024-04-30 RX ADMIN — HYDROMORPHONE HYDROCHLORIDE 0.5 MG: 1 INJECTION, SOLUTION INTRAMUSCULAR; INTRAVENOUS; SUBCUTANEOUS at 12:53

## 2024-04-30 RX ADMIN — KETOROLAC TROMETHAMINE 15 MG: 15 INJECTION, SOLUTION INTRAMUSCULAR; INTRAVENOUS at 10:49

## 2024-04-30 ASSESSMENT — ACTIVITIES OF DAILY LIVING (ADL)
ADLS_ACUITY_SCORE: 35

## 2024-04-30 ASSESSMENT — COLUMBIA-SUICIDE SEVERITY RATING SCALE - C-SSRS
6. HAVE YOU EVER DONE ANYTHING, STARTED TO DO ANYTHING, OR PREPARED TO DO ANYTHING TO END YOUR LIFE?: NO
1. IN THE PAST MONTH, HAVE YOU WISHED YOU WERE DEAD OR WISHED YOU COULD GO TO SLEEP AND NOT WAKE UP?: NO
2. HAVE YOU ACTUALLY HAD ANY THOUGHTS OF KILLING YOURSELF IN THE PAST MONTH?: NO

## 2024-04-30 NOTE — ED NOTES
Patient rates pain 7/10 in L. Flank area and L. Abdomen.  Took Tylenol 1000 mg at about 0700.  Patient was seen Sat morning and Sunday and diagnosed with kidney stone sent home with pain meds and antiemetics.  Patient verbalized only took Zofran 1st day and has not taken again it as he thought that was causing him to throw up .  Patient has been unable to keep down pain medications

## 2024-04-30 NOTE — ED TRIAGE NOTES
Left sided flank pain started Saturday AM. Has been seen twice for same pain in the past 2 days. Pt states CT done and kidney stone found. Unable to take prescribed narcotics d/t nausea. Taking ibuprofen and tylenol. Pt also c/o N/V     Triage Assessment (Adult)       Row Name 04/30/24 0919          Triage Assessment    Airway WDL WDL        Respiratory WDL    Respiratory WDL WDL        Skin Circulation/Temperature WDL    Skin Circulation/Temperature WDL WDL        Cardiac WDL    Cardiac WDL WDL        Peripheral/Neurovascular WDL    Peripheral Neurovascular WDL WDL        Cognitive/Neuro/Behavioral WDL    Cognitive/Neuro/Behavioral WDL WDL

## 2024-04-30 NOTE — PROGRESS NOTES
[facility] called Liv and pt back from  they left this morning for me with sx.  Pt up since about 3am today with severe pain and vomiting.  Writer advised pt needs to go to the ER right away and be evaluated.  Pt expressed the want for surgery and writer advised they will only stent likely if they feel that is appropriate until we can consult him for surgery.  Pt also expressed that he took more laxative than intended and that may have contributed to his overall worse feeling and increase to sx.  PT and spouse Apple verbalized understanding and agree to plan they will go to the ER.  TAMY Lee  Care Coordinator Urology  [phone]

## 2024-04-30 NOTE — ED PROVIDER NOTES
"  History     Chief Complaint   Patient presents with    Abdominal Pain    Flank Pain     Left sided, hx of kidney stone, pain started on Saturday     HPI  Iain Claros is a 53 year old male who presents for left flank pain.  The patient says that he has a known kidney stone and has severe pain in the left flank with nausea and vomiting.  He says he has stopped taking the ondansetron because it feels like it is making him more nauseous.  He has tried taking the oxycodone but he vomits every time he takes it.  He has been using acetaminophen and ibuprofen for the pain but has continued severe pain and is retching frequently.  No fevers.  Pain is in the left flank and radiates into the left lower quadrant.  He had felt a lot better yesterday but then became much more symptomatic around 3:00 this morning.    I reviewed the patient's emergency department visit from 4/28/2024, recheck of his ureteral stone and continued pain.  I rechecked the patient's emergency department visit from 4/27/2024 and reviewed the patient's CT scan showing 4 mm urolithiasis.    Allergies:  Allergies   Allergen Reactions    Penicillins Rash       Problem List:    There are no problems to display for this patient.       Past Medical History:    No past medical history on file.    Past Surgical History:    No past surgical history on file.    Family History:    No family history on file.    Social History:  Marital Status:  Single [1]        Medications:    ondansetron (ZOFRAN ODT) 4 MG ODT tab  ondansetron (ZOFRAN ODT) 4 MG ODT tab  oxyCODONE (ROXICODONE) 5 MG tablet  oxyCODONE (ROXICODONE) 5 MG tablet  tamsulosin (FLOMAX) 0.4 MG capsule          Review of Systems    Physical Exam   BP: 127/84  Pulse: 82  Temp: 97.1  F (36.2  C)  Resp: 24  Height: 175.3 cm (5' 9\")  Weight: 97.5 kg (215 lb)  SpO2: 99 %      Physical Exam  Vitals and nursing note reviewed.   Constitutional:       General: He is in acute distress.      Appearance: He is " well-developed. He is not diaphoretic.   HENT:      Head: Normocephalic and atraumatic.      Right Ear: External ear normal.      Left Ear: External ear normal.      Nose: Nose normal.   Eyes:      General: No scleral icterus.     Conjunctiva/sclera: Conjunctivae normal.   Cardiovascular:      Rate and Rhythm: Normal rate and regular rhythm.   Pulmonary:      Effort: Pulmonary effort is normal. No respiratory distress.      Breath sounds: No stridor.   Abdominal:      General: There is no distension.      Palpations: Abdomen is soft.      Tenderness: There is no abdominal tenderness. There is left CVA tenderness.   Musculoskeletal:      Cervical back: Normal range of motion.   Skin:     General: Skin is warm and dry.   Neurological:      Mental Status: He is alert.   Psychiatric:         Behavior: Behavior normal.         ED Course        Procedures              Critical Care time:  none               Results for orders placed or performed during the hospital encounter of 04/30/24 (from the past 24 hour(s))   [place] Draw    Narrative    The following orders were created for panel order [place] Draw.  Procedure                               Abnormality         Status                     ---------                               -----------         ------                     Extra Green Top (Lithium...[[id number]]                      Final result               Extra Purple Top Tube[[id number]]                            Final result                 Please view results for these tests on the individual orders.   Extra Green Top (Lithium Heparin) Tube   Result Value Ref Range    Hold Specimen JIC    Extra Purple Top Tube   Result Value Ref Range    Hold Specimen JIC        Medications   HYDROmorphone (PF) (DILAUDID) injection 0.5 mg (0.5 mg Intravenous $Given 4/30/24 1253)   ondansetron (ZOFRAN) injection 4 mg (4 mg Intravenous $Given 4/30/24 1049)   ketorolac (TORADOL) injection 15 mg (15 mg Intravenous $Given 4/30/24  1049)   lactated ringers BOLUS 1,000 mL (0 mLs Intravenous Stopped 4/30/24 1248)   lactated ringers BOLUS 1,000 mL (0 mLs Intravenous Stopped 4/30/24 1248)       Assessments & Plan (with Medical Decision Making)   53-year-old male with a known kidney stone who presents for continued pain and vomiting with this kidney stone.  He is nontoxic on exam with reassuring vital signs.  He is given IV ketorolac, hydromorphone, ondansetron, and fluids.  On recheck he is feeling better and is tolerating oral intake.  He is safe to discharge home with additional ondansetron prescribed and instructions to return if he has worsening symptoms or other concerns, otherwise follow-up in clinic.  The patient is in agreement with this plan.    I have reviewed the nursing notes.    I have reviewed the findings, diagnosis, plan and need for follow up with the patient.           Medical Decision Making  The patient's presentation was of high complexity (a chronic illness severe exacerbation, progression, or side effect of treatment).    The patient's evaluation involved:  review of external note(s) from 2 sources (see separate area of note for details)    The patient's management necessitated high risk (a parenteral controlled substance).        Discharge Medication List as of 4/30/2024 12:48 PM          Final diagnoses:   Left flank pain   Ureteral stone       4/30/2024   [facility] EMERGENCY DEPT       Phillip Cisneros MD  04/30/24 [id number]

## 2024-04-30 NOTE — DISCHARGE INSTRUCTIONS
Continue taking the medications at home help with your symptoms.  Use ondansetron as needed for nausea.  Drink plenty of fluids.  Take acetaminophen and ibuprofen every 6 hours for your symptoms.  Use the oxycodone as needed for severe pain.  Return to the emergency department for fevers, repeated vomiting, severe pain cannot control at home, or other concerns.  Otherwise follow-up in clinic.    Try taking MiraLAX as needed to help with bowel movements at home.

## 2024-05-01 ENCOUNTER — PATIENT OUTREACH (OUTPATIENT)
Dept: UROLOGY | Facility: CLINIC | Age: 54
End: 2024-05-01

## 2024-05-01 ENCOUNTER — TELEPHONE (OUTPATIENT)
Dept: FAMILY MEDICINE | Facility: CLINIC | Age: 54
End: 2024-05-01

## 2024-05-01 NOTE — PROGRESS NOTES
RNCC called pt and spoke to him regarding sx.  Improved and pain still presenting but less than prior days.  He agrees to consult and will have a virtual visit with PIEDAD Novak today. Pt agrees and will call with any questions  TAMY Lee  Care Coordinator Urology  [phone]

## 2024-05-01 NOTE — PROGRESS NOTES
Returned call back to spouse AppleKey Blanco with direct call back for questions she has  Rosa, RN  Care Coordinator Urology  [phone]

## 2024-05-01 NOTE — TELEPHONE ENCOUNTER
"Pt calls and reports extreme abdominal pain with abdominal distention. Reports abdomen is \"rock hard\". Has not had a bm in 2 days with the pain medication he is taking for kidney stones.   Pt states was supposed to have a surgery and wondered if he goes back if he could have the surgery still. Adv that would be up to er provider and surgeon. Adv we do have surgeons here however if needs specialist there is a chance he might need to be transferred.     Pt will go to er now.       Curry Keys RN    "

## 2024-05-01 NOTE — PROGRESS NOTES
Pt called reporting ongoing abdominal pain that is now 8/10.  Pt reports taking stool softener/laxative before calling as he is also not able to have a bm in the last 48-72 hours.  Only small  amount and was very hard.  Pt advised to stop laxative if not helping and if pain is severe and not controlled will need ER again.  PT reports using the otcs and rx meds for pain but only has relief for a time and then back to severe.  Pt is on for consult today with stone provider.  Encouraged pt that ER is appropriate for instance of his severe pain. Pt and spouse verbalized understanding and agree to plan.  Wanted to keep visit with provider for now  TAMY Lee  Care Coordinator Urology  [phone]

## 2024-05-01 NOTE — PROGRESS NOTES
RNCC received call back from Liv and pt continues to have ongoing severe pain.  Writer advised needs ER for this severe pain and that they will likely stent him and not do a full surgery.  Writer also advised that all of this depends on many factors and the ER doctors will have to make the final call.  Pt will go to the ER.  Pt Cancelling his virtual with Venkat Lee RN  Care Coordinator Urology  [phone]